# Patient Record
Sex: FEMALE | Race: WHITE
[De-identification: names, ages, dates, MRNs, and addresses within clinical notes are randomized per-mention and may not be internally consistent; named-entity substitution may affect disease eponyms.]

---

## 2017-01-09 ENCOUNTER — HOSPITAL ENCOUNTER (OUTPATIENT)
Dept: HOSPITAL 47 - MMGSC | Age: 37
Discharge: HOME | End: 2017-01-09
Payer: MEDICAID

## 2017-01-09 DIAGNOSIS — E03.9: Primary | ICD-10-CM

## 2017-01-09 PROCEDURE — 84443 ASSAY THYROID STIM HORMONE: CPT

## 2017-01-09 PROCEDURE — 84439 ASSAY OF FREE THYROXINE: CPT

## 2017-01-09 PROCEDURE — 36415 COLL VENOUS BLD VENIPUNCTURE: CPT

## 2017-05-22 ENCOUNTER — HOSPITAL ENCOUNTER (OUTPATIENT)
Dept: HOSPITAL 47 - MMGSC | Age: 37
End: 2017-05-22
Payer: COMMERCIAL

## 2017-05-22 DIAGNOSIS — Z00.00: Primary | ICD-10-CM

## 2017-05-22 LAB
ALP SERPL-CCNC: 65 U/L (ref 38–126)
ALT SERPL-CCNC: 86 U/L (ref 9–52)
ANION GAP SERPL CALC-SCNC: 16 MMOL/L
AST SERPL-CCNC: 73 U/L (ref 14–36)
BASOPHILS # BLD AUTO: 0.1 K/UL (ref 0–0.2)
BASOPHILS NFR BLD AUTO: 1 %
BUN SERPL-SCNC: 11 MG/DL (ref 7–17)
CALCIUM SPEC-MCNC: 9.5 MG/DL (ref 8.4–10.2)
CH: 26.9
CHCM: 31
CHLORIDE SERPL-SCNC: 103 MMOL/L (ref 98–107)
CHOLEST SERPL-MCNC: 193 MG/DL (ref ?–200)
CO2 SERPL-SCNC: 27 MMOL/L (ref 22–30)
EOSINOPHIL # BLD AUTO: 0.4 K/UL (ref 0–0.7)
EOSINOPHIL NFR BLD AUTO: 5 %
ERYTHROCYTE [DISTWIDTH] IN BLOOD BY AUTOMATED COUNT: 5.13 M/UL (ref 3.8–5.4)
ERYTHROCYTE [DISTWIDTH] IN BLOOD: 14.8 % (ref 11.5–15.5)
GLUCOSE SERPL-MCNC: 85 MG/DL (ref 74–99)
HCT VFR BLD AUTO: 44.7 % (ref 34–46)
HDLC SERPL-MCNC: 36 MG/DL (ref 40–60)
HDW: 2.67
HGB BLD-MCNC: 14 GM/DL (ref 11.4–16)
LUC NFR BLD AUTO: 1 %
LYMPHOCYTES # SPEC AUTO: 2.5 K/UL (ref 1–4.8)
LYMPHOCYTES NFR SPEC AUTO: 31 %
MCH RBC QN AUTO: 27.2 PG (ref 25–35)
MCHC RBC AUTO-ENTMCNC: 31.3 G/DL (ref 31–37)
MCV RBC AUTO: 87.2 FL (ref 80–100)
MONOCYTES # BLD AUTO: 0.4 K/UL (ref 0–1)
MONOCYTES NFR BLD AUTO: 4 %
NEUTROPHILS # BLD AUTO: 4.6 K/UL (ref 1.3–7.7)
NEUTROPHILS NFR BLD AUTO: 58 %
NON-AFRICAN AMERICAN GFR(MDRD): >60
POTASSIUM SERPL-SCNC: 4 MMOL/L (ref 3.5–5.1)
PROT SERPL-MCNC: 7.9 G/DL (ref 6.3–8.2)
SODIUM SERPL-SCNC: 146 MMOL/L (ref 137–145)
TRIGL SERPL-MCNC: 281 MG/DL (ref ?–150)
WBC # BLD AUTO: 0.11 10*3/UL
WBC # BLD AUTO: 8 K/UL (ref 3.8–10.6)
WBC (PEROX): 7.13

## 2017-05-22 PROCEDURE — 84443 ASSAY THYROID STIM HORMONE: CPT

## 2017-05-22 PROCEDURE — 80061 LIPID PANEL: CPT

## 2017-05-22 PROCEDURE — 85025 COMPLETE CBC W/AUTO DIFF WBC: CPT

## 2017-05-22 PROCEDURE — 80053 COMPREHEN METABOLIC PANEL: CPT

## 2017-05-22 PROCEDURE — 84439 ASSAY OF FREE THYROXINE: CPT

## 2017-05-22 PROCEDURE — 36415 COLL VENOUS BLD VENIPUNCTURE: CPT

## 2017-07-06 ENCOUNTER — HOSPITAL ENCOUNTER (OUTPATIENT)
Dept: HOSPITAL 47 - BARWHC3 | Age: 37
Discharge: HOME | End: 2017-07-06
Payer: COMMERCIAL

## 2017-07-06 VITALS — BODY MASS INDEX: 52.7 KG/M2

## 2017-07-06 VITALS
SYSTOLIC BLOOD PRESSURE: 136 MMHG | DIASTOLIC BLOOD PRESSURE: 83 MMHG | RESPIRATION RATE: 16 BRPM | HEART RATE: 82 BPM | TEMPERATURE: 97.1 F

## 2017-07-06 DIAGNOSIS — E55.9: ICD-10-CM

## 2017-07-06 DIAGNOSIS — K90.89: ICD-10-CM

## 2017-07-06 DIAGNOSIS — E66.01: Primary | ICD-10-CM

## 2017-07-06 DIAGNOSIS — I10: ICD-10-CM

## 2017-07-06 LAB
ALP SERPL-CCNC: 61 U/L (ref 38–126)
ALT SERPL-CCNC: 123 U/L (ref 9–52)
ANION GAP SERPL CALC-SCNC: 10 MMOL/L
AST SERPL-CCNC: 87 U/L (ref 14–36)
BUN SERPL-SCNC: 11 MG/DL (ref 7–17)
CALCIUM SPEC-MCNC: 9.2 MG/DL (ref 8.4–10.2)
CH: 27
CHCM: 31.2
CHLORIDE SERPL-SCNC: 105 MMOL/L (ref 98–107)
CO2 SERPL-SCNC: 27 MMOL/L (ref 22–30)
EKG: (no result)
ERYTHROCYTE [DISTWIDTH] IN BLOOD BY AUTOMATED COUNT: 4.72 M/UL (ref 3.8–5.4)
ERYTHROCYTE [DISTWIDTH] IN BLOOD: 15 % (ref 11.5–15.5)
GLUCOSE SERPL-MCNC: 81 MG/DL (ref 74–99)
HCT VFR BLD AUTO: 41 % (ref 34–46)
HDW: 2.5
HGB BLD-MCNC: 13.2 GM/DL (ref 11.4–16)
IRON SERPL-MCNC: 50 UG/DL (ref 37–170)
MCH RBC QN AUTO: 28 PG (ref 25–35)
MCHC RBC AUTO-ENTMCNC: 32.3 G/DL (ref 31–37)
MCV RBC AUTO: 86.9 FL (ref 80–100)
NON-AFRICAN AMERICAN GFR(MDRD): >60
POTASSIUM SERPL-SCNC: 4.5 MMOL/L (ref 3.5–5.1)
PROT SERPL-MCNC: 7.5 G/DL (ref 6.3–8.2)
SODIUM SERPL-SCNC: 142 MMOL/L (ref 137–145)
VIT B12 SERPL-MCNC: 298 PG/ML (ref 239–931)
WBC # BLD AUTO: 7.8 K/UL (ref 3.8–10.6)

## 2017-07-06 PROCEDURE — 82306 VITAMIN D 25 HYDROXY: CPT

## 2017-07-06 PROCEDURE — 83540 ASSAY OF IRON: CPT

## 2017-07-06 PROCEDURE — 93005 ELECTROCARDIOGRAM TRACING: CPT

## 2017-07-06 PROCEDURE — 82607 VITAMIN B-12: CPT

## 2017-07-06 PROCEDURE — 85027 COMPLETE CBC AUTOMATED: CPT

## 2017-07-06 PROCEDURE — 83036 HEMOGLOBIN GLYCOSYLATED A1C: CPT

## 2017-07-06 PROCEDURE — 80053 COMPREHEN METABOLIC PANEL: CPT

## 2017-07-06 PROCEDURE — 99201: CPT

## 2017-07-06 PROCEDURE — 84425 ASSAY OF VITAMIN B-1: CPT

## 2017-07-06 NOTE — P.HPBAR
Bariatric H&P





- History & Physicial


H&P Date: 17


History & Physicial: 


Visit/CC: sleeve consult





Patient initial contact: 





Initial weight: 142.836 kg


Initial weight in pounds: 314.90


Height: 5 ft 4.76 in


Initial BMI: 52.7





Last weight: 





Current weight: 142.836 kg


Current weight in pounds: 314.90


Current BMI: 52.7





Ideal body weight (based on NIH guidelines): 56.155 kg





Excess body weight loss: 0.0%








The patient is a 37 year-old F who presents for Bariatric Assessment.  Patient 

seen today at the bariatric clinic for a new patient evaluation.  The patient 

has family members that have had both a gastric bypass and the sleeve 

gastrectomy.  Patient is interested in sleeve gastrectomy at this point.  The 

patient suffers from hypertension.  She did have gestational diabetes in the 

past.  Denies any history of DVT or dysphagia in the past.  The patient has 

tried a variety of different weight loss modalities without any sustained 

weight loss.














Review of Systems





The patient denies any acute changes in vision or hearing, no dysphagia or 

odynophagia, no chest pain or shortness of breath, no dysuria or hematuria, no 

headache, no runny nose, no rectal bleeding or melena, no unexplained weight 

loss 





Past Medical History


Past Medical History: Asthma, Thyroid Disorder


Additional Past Medical History / Comment(s): GESTATIONAL DIABETES,VARICOSE 

VEINS,VERTIGO,IBS


History of Any Multi-Drug Resistant Organisms: None Reported


Past Surgical History:  Section, Orthopedic Surgery


Additional Past Surgical History / Comment(s): EXTERNAL PELVIC FIXATOR APPLIED 

AND REMOVAL,ORIF LT ANKLE, lasic,  x2


Past Anesthesia/Blood Transfusion Reactions: Motion Sickness


Past Psychological History: No Psychological Hx Reported


Additional Psychological History / Comment(s): suffered from Post partum 

depression, since resolved


Smoking Status: Never smoker


Past Alcohol Use History: Rare


Past Drug Use History: None Reported





- Past Family History


  ** Father


Family Medical History: Coronary Artery Disease (CAD), Diabetes Mellitus


Additional Family Medical History / Comment(s):  at age 62





  ** Brother(s)


Family Medical History: Cancer


Additional Family Medical History / Comment(s): LYMPHOMA





  ** Mother


Family Medical History: Deep Vein Thrombosis (DVT), Pulmonary Embolus





Surgical - Exam


 Vital Signs











Temp Pulse Resp BP


 


 97.1 F L  82   16   136/83 


 


 17 14:20  17 14:20  17 14:20  17 14:20

















Physical exam:


General: Well-developed, well-nourished


HEENT: Normocephalic, sclerae nonicteric


Abdomen: Nontender, nondistended


Extremities: No edema


Neuro: Alert and oriented





Bariatric Assessment & Plan


(1) Morbid obesity


Narrative/Plan: 


The surgical risks and benefits were discussed in detail with the patient 

today.  All questions were appropriately answered.  We'll tentatively schedule 

for basic lab work and upper endoscopy as a preoperative evaluation.  

Definitive bariatric procedure scheduling to follow.


Status: Acute   





Bariatric Checklist


Checklist: 


Plan: 





Checklist: 





EGD: 


1. Hiatal hernia: 


2. H. Pylori: 





HgbA1c: 





Vitamin D: 





Smoking: Never smoker





Primary care physician referral: Maria A See ([place])





Psychiatry clearance: 





Cardiology clearance: 





Sleep study: 





Diet journal: 





VTE risk score: 





VTE risk level: 





Rehab needs at discharge:

## 2017-07-07 LAB — HEMOGLOBIN A1C: 5.7 % (ref 4.2–6.1)

## 2017-08-04 ENCOUNTER — HOSPITAL ENCOUNTER (OUTPATIENT)
Dept: HOSPITAL 47 - ORWHC2ENDO | Age: 37
Discharge: HOME | End: 2017-08-04
Payer: COMMERCIAL

## 2017-08-04 VITALS — DIASTOLIC BLOOD PRESSURE: 91 MMHG | HEART RATE: 76 BPM | SYSTOLIC BLOOD PRESSURE: 136 MMHG

## 2017-08-04 VITALS — TEMPERATURE: 97.2 F

## 2017-08-04 VITALS — BODY MASS INDEX: 51.7 KG/M2

## 2017-08-04 VITALS — RESPIRATION RATE: 16 BRPM

## 2017-08-04 DIAGNOSIS — Z91.012: ICD-10-CM

## 2017-08-04 DIAGNOSIS — I10: ICD-10-CM

## 2017-08-04 DIAGNOSIS — E07.9: ICD-10-CM

## 2017-08-04 DIAGNOSIS — Z91.011: ICD-10-CM

## 2017-08-04 DIAGNOSIS — Z91.040: ICD-10-CM

## 2017-08-04 DIAGNOSIS — J45.909: ICD-10-CM

## 2017-08-04 DIAGNOSIS — Z79.899: ICD-10-CM

## 2017-08-04 DIAGNOSIS — K29.50: Primary | ICD-10-CM

## 2017-08-04 LAB — GLUCOSE BLD-MCNC: 83 MG/DL (ref 75–99)

## 2017-08-04 PROCEDURE — 88305 TISSUE EXAM BY PATHOLOGIST: CPT

## 2017-08-04 PROCEDURE — 88342 IMHCHEM/IMCYTCHM 1ST ANTB: CPT

## 2017-08-04 PROCEDURE — 81025 URINE PREGNANCY TEST: CPT

## 2017-08-04 PROCEDURE — 43239 EGD BIOPSY SINGLE/MULTIPLE: CPT

## 2017-08-04 NOTE — P.GSHP
History of Present Illness


H&P Date: 17


Chief Complaint: GERD





Patient today for upper endoscopy.  She's been worked up for possible bariatric 

surgery.  She has mild intermittent reflux.  Denies dysphagia.





Past Medical History


Past Medical History: Asthma, Thyroid Disorder


Additional Past Medical History / Comment(s): HAD GESTATIONAL DIABETES,VARICOSE 

VEINS,VERTIGO,IBS


History of Any Multi-Drug Resistant Organisms: None Reported


Past Surgical History:  Section, Orthopedic Surgery, Tubal Ligation


Additional Past Surgical History / Comment(s): EXTERNAL PELVIC FIXATOR APPLIED 

AND REMOVAL,ORIF LT ANKLE, lasic,  x2


Past Anesthesia/Blood Transfusion Reactions: Motion Sickness


Smoking Status: Never smoker





- Past Family History


  ** Father


Family Medical History: Coronary Artery Disease (CAD), Diabetes Mellitus


Additional Family Medical History / Comment(s):  at age 62





  ** Brother(s)


Family Medical History: Cancer


Additional Family Medical History / Comment(s): LYMPHOMA





  ** Mother


Family Medical History: Deep Vein Thrombosis (DVT), Pulmonary Embolus





Medications and Allergies


 Home Medications











 Medication  Instructions  Recorded  Confirmed  Type


 


Levothyroxine Sodium [Synthroid] 100 mcg PO DAILY 14 History


 


Albuterol Sulfate [Proair Hfa] 1 - 2 puff INHALATION Q6HR PRN 17 

History


 


Cholestyramine (with Sugar) 4 gm PO DAILY 17 History





[Cholestyramine Packet]    


 


Triamterene-Hctz 37.5-25Mg 1 cap PO DAILY 17 History





[Dyazide 37.5-25 Capsule]    


 


Ergocalciferol (Vitamin D2) 50,000 unit PO Q7D 17 History





[Vitamin D2]    











 Allergies











Allergy/AdvReac Type Severity Reaction Status Date / Time


 


egg Allergy  Diarrhea Verified 17 12:40


 


lactose Allergy  Diarrhea Verified 17 12:40


 


latex Allergy  Rash/Hives Verified 17 12:40


 


soy Allergy  Diarrhea Verified 17 12:40














Surgical - Exam


 Vital Signs











Temp Pulse Resp BP Pulse Ox


 


 97.2 F L  88   18   141/84   97 


 


 17 12:56  17 12:56  17 12:56  17 12:56  17 12:56

















Physical exam:


General: Well-developed, well-nourished


HEENT: Normocephalic, sclerae nonicteric


Abdomen: Nontender, nondistended


Extremities: No edema


Neuro: Alert and oriented





Assessment and Plan


(1) GERD (gastroesophageal reflux disease)


Narrative/Plan: 


Will proceed with upper endoscopy at this time.


Status: Acute

## 2017-08-04 NOTE — P.PCN
Date of Procedure: 08/04/17


Preoperative Diagnosis: 





Postoperative Diagnosis: 





Procedure(s) Performed: 


Preoperative Dx: GERD


Postoperative Dx: Gastritis with small erosions


Procedure: EGD with Bx


Anesthesia: Sedation


Endoscopist: Dr. Kirk


Specimens: Antrum


Endoscopic Procedure:   The patient was on the endoscopy table in the left 

decubitus position.  The Olympus gastroscope was inserted into the oropharynx 

and passed under direct visualization to the region of the third portion of the 

duodenum.  From that point the scope was slowly withdrawn inspecting all 

surfaces carefully.  There were no neoplastic inflammatory or polypoid lesions 

throughout the duodenum.  The pylorus was widely patent.  The stomach was 

carefully inspected.  There was antritis present with a few small erosions.  A 

biopsy of the antrum took place to rule out H. pylori.  Retroflexion revealed a 

normal hiatus.  The esophagus was then carefully examined.  There were no 

neoplastic inflammatory or polypoid lesions throughout the visualized 

esophagus.  The patient was then taken to the recovery room in stable condition 

per anesthesia guidelines.


Recommendations: Will begin antiacid therapy.  Await biopsy results.  Patient 

will follow up in the bariatric clinic in 3-4 weeks.


Implants: 





Indications for Procedure: 





Operative Findings: 





Description of Procedure:

## 2017-08-07 ENCOUNTER — HOSPITAL ENCOUNTER (OUTPATIENT)
Dept: HOSPITAL 47 - MMGSC | Age: 37
Discharge: HOME | End: 2017-08-07
Payer: COMMERCIAL

## 2017-08-07 DIAGNOSIS — E55.9: Primary | ICD-10-CM

## 2017-08-07 PROCEDURE — 82306 VITAMIN D 25 HYDROXY: CPT

## 2017-08-07 PROCEDURE — 36415 COLL VENOUS BLD VENIPUNCTURE: CPT

## 2017-08-21 ENCOUNTER — HOSPITAL ENCOUNTER (OUTPATIENT)
Dept: HOSPITAL 47 - RADUSWWP | Age: 37
Discharge: HOME | End: 2017-08-21
Payer: COMMERCIAL

## 2017-08-21 DIAGNOSIS — K76.0: Primary | ICD-10-CM

## 2017-08-21 PROCEDURE — 76705 ECHO EXAM OF ABDOMEN: CPT

## 2017-08-21 NOTE — US
EXAMINATION TYPE: US liver

 

DATE OF EXAM: 8/21/2017

 

COMPARISON: NONE

 

CLINICAL HISTORY: Elevated LFTs R94.5. no symptoms

 

EXAM MEASUREMENTS:

 

Liver Length:  19.9 cm   

Gallbladder Wall:  0.2 cm   

CBD:  0.6 cm

Right Kidney:  11.9 x 5.1 x 4.6 cm

 

limited visibility due to bowel gas and habitus

 

Pancreas:  limited views appear, wnl

Liver:  difficult to penetrate, enlarged  

Gallbladder:  wnl

**Evidence for sonographic Santo's sign:  no

CBD:  wnl 

Right Kidney:  wnl 

 

There appears to be moderate fatty infiltration to the liver. Hepatomegaly is present in 19.9 cm. Nor
mal less than 15.5 cm.

 

IMPRESSION: 

1. Hepatomegaly with moderate fatty infiltration liver.

## 2017-11-06 ENCOUNTER — HOSPITAL ENCOUNTER (OUTPATIENT)
Dept: HOSPITAL 47 - BARWHC3 | Age: 37
End: 2017-11-06
Payer: COMMERCIAL

## 2017-11-06 VITALS — BODY MASS INDEX: 53.6 KG/M2

## 2017-11-06 DIAGNOSIS — Z71.3: Primary | ICD-10-CM

## 2017-11-06 DIAGNOSIS — E66.01: ICD-10-CM

## 2017-11-06 PROCEDURE — 97804 MEDICAL NUTRITION GROUP: CPT

## 2017-11-21 ENCOUNTER — HOSPITAL ENCOUNTER (OUTPATIENT)
Dept: HOSPITAL 47 - LABPAT | Age: 37
Discharge: HOME | End: 2017-11-21
Payer: COMMERCIAL

## 2017-11-21 DIAGNOSIS — Z01.812: Primary | ICD-10-CM

## 2017-11-21 LAB
ALP SERPL-CCNC: 53 U/L (ref 38–126)
ALT SERPL-CCNC: 136 U/L (ref 9–52)
ANION GAP SERPL CALC-SCNC: 12 MMOL/L
AST SERPL-CCNC: 109 U/L (ref 14–36)
BASOPHILS # BLD AUTO: 0 K/UL (ref 0–0.2)
BASOPHILS NFR BLD AUTO: 1 %
BUN SERPL-SCNC: 17 MG/DL (ref 7–17)
CALCIUM SPEC-MCNC: 10 MG/DL (ref 8.4–10.2)
CH: 26.5
CHCM: 30.4
CHLORIDE SERPL-SCNC: 102 MMOL/L (ref 98–107)
CO2 SERPL-SCNC: 28 MMOL/L (ref 22–30)
EOSINOPHIL # BLD AUTO: 0.3 K/UL (ref 0–0.7)
EOSINOPHIL NFR BLD AUTO: 4 %
ERYTHROCYTE [DISTWIDTH] IN BLOOD BY AUTOMATED COUNT: 4.79 M/UL (ref 3.8–5.4)
ERYTHROCYTE [DISTWIDTH] IN BLOOD: 15.5 % (ref 11.5–15.5)
GLUCOSE SERPL-MCNC: 86 MG/DL (ref 74–99)
HCT VFR BLD AUTO: 41.9 % (ref 34–46)
HDW: 2.43
HGB BLD-MCNC: 12.9 GM/DL (ref 11.4–16)
LUC NFR BLD AUTO: 2 %
LYMPHOCYTES # SPEC AUTO: 2.2 K/UL (ref 1–4.8)
LYMPHOCYTES NFR SPEC AUTO: 32 %
MCH RBC QN AUTO: 26.9 PG (ref 25–35)
MCHC RBC AUTO-ENTMCNC: 30.8 G/DL (ref 31–37)
MCV RBC AUTO: 87.5 FL (ref 80–100)
MONOCYTES # BLD AUTO: 0.3 K/UL (ref 0–1)
MONOCYTES NFR BLD AUTO: 4 %
NEUTROPHILS # BLD AUTO: 3.9 K/UL (ref 1.3–7.7)
NEUTROPHILS NFR BLD AUTO: 57 %
NON-AFRICAN AMERICAN GFR(MDRD): >60
POTASSIUM SERPL-SCNC: 4.3 MMOL/L (ref 3.5–5.1)
PROT SERPL-MCNC: 8.3 G/DL (ref 6.3–8.2)
SODIUM SERPL-SCNC: 142 MMOL/L (ref 137–145)
WBC # BLD AUTO: 0.15 10*3/UL
WBC # BLD AUTO: 6.8 K/UL (ref 3.8–10.6)
WBC (PEROX): 6.9

## 2017-11-21 PROCEDURE — 85025 COMPLETE CBC W/AUTO DIFF WBC: CPT

## 2017-11-21 PROCEDURE — 80053 COMPREHEN METABOLIC PANEL: CPT

## 2017-11-21 PROCEDURE — 36415 COLL VENOUS BLD VENIPUNCTURE: CPT

## 2017-12-12 ENCOUNTER — HOSPITAL ENCOUNTER (OUTPATIENT)
Dept: HOSPITAL 47 - BARWHC3 | Age: 37
Discharge: HOME | End: 2017-12-12
Payer: COMMERCIAL

## 2017-12-12 VITALS
DIASTOLIC BLOOD PRESSURE: 91 MMHG | HEART RATE: 80 BPM | TEMPERATURE: 98.3 F | RESPIRATION RATE: 20 BRPM | SYSTOLIC BLOOD PRESSURE: 115 MMHG

## 2017-12-12 VITALS — BODY MASS INDEX: 47.8 KG/M2

## 2017-12-12 DIAGNOSIS — E55.9: ICD-10-CM

## 2017-12-12 DIAGNOSIS — E66.01: Primary | ICD-10-CM

## 2017-12-12 PROCEDURE — 99211 OFF/OP EST MAY X REQ PHY/QHP: CPT

## 2017-12-12 PROCEDURE — 97803 MED NUTRITION INDIV SUBSEQ: CPT

## 2017-12-12 PROCEDURE — 99201: CPT

## 2017-12-28 NOTE — P.BASOAP
Subjective


Progress Note Date: 12/28/17


Principal diagnosis: 





Morbid obesity





Patient returns after recent sleeve gastrectomy.  Doing well.  Mild discomforts 

are improving.  Tolerating liquid and protein.  No heartburn.





Objective





- Vital Signs


Vital signs: 


 Vital Signs











Temp  98.3 F   12/12/17 12:40


 


Pulse  80   12/12/17 12:40


 


Resp  20   12/12/17 12:40


 


BP  115/91   12/12/17 12:40


 


Pulse Ox      














- Exam





Abdomen: Incisions clean and dry, minimal tenderness





Assessment/Plan


(1) Morbid obesity


Narrative/Plan: 


Continue gradually advancing diet.  Gradually increase activity as well.  Follow

-up 2-3 weeks.





Plan: 


Date: 12/12/17





Initial Weight: 142.836 kg





Initial BMI: 52.8





Current Weight: 129.41 kg





Current BMI: 47.8





Type of Surgery: 





Total Volume in Band: 





Previous Volume: 





Volume Removed: 





Volume Added: 





Band Size:

## 2018-01-08 ENCOUNTER — HOSPITAL ENCOUNTER (OUTPATIENT)
Dept: HOSPITAL 47 - LABWHC1 | Age: 38
Discharge: HOME | End: 2018-01-08
Payer: COMMERCIAL

## 2018-01-08 DIAGNOSIS — K90.9: ICD-10-CM

## 2018-01-08 DIAGNOSIS — R79.89: ICD-10-CM

## 2018-01-08 DIAGNOSIS — E55.9: ICD-10-CM

## 2018-01-08 DIAGNOSIS — K90.89: ICD-10-CM

## 2018-01-08 DIAGNOSIS — E66.01: Primary | ICD-10-CM

## 2018-01-08 LAB
ALBUMIN SERPL-MCNC: 4.3 G/DL (ref 3.5–5)
ALP SERPL-CCNC: 53 U/L (ref 38–126)
ALT SERPL-CCNC: 94 U/L (ref 9–52)
ANION GAP SERPL CALC-SCNC: 12 MMOL/L
AST SERPL-CCNC: 64 U/L (ref 14–36)
BUN SERPL-SCNC: 14 MG/DL (ref 7–17)
CALCIUM SPEC-MCNC: 9.9 MG/DL (ref 8.4–10.2)
CHLORIDE SERPL-SCNC: 106 MMOL/L (ref 98–107)
CO2 SERPL-SCNC: 29 MMOL/L (ref 22–30)
ERYTHROCYTE [DISTWIDTH] IN BLOOD BY AUTOMATED COUNT: 5.17 M/UL (ref 3.8–5.4)
ERYTHROCYTE [DISTWIDTH] IN BLOOD: 16.3 % (ref 11.5–15.5)
GLUCOSE SERPL-MCNC: 108 MG/DL (ref 74–99)
HCT VFR BLD AUTO: 45.7 % (ref 34–46)
HGB BLD-MCNC: 13.9 GM/DL (ref 11.4–16)
MCH RBC QN AUTO: 26.9 PG (ref 25–35)
MCHC RBC AUTO-ENTMCNC: 30.4 G/DL (ref 31–37)
MCV RBC AUTO: 88.5 FL (ref 80–100)
PLATELET # BLD AUTO: 219 K/UL (ref 150–450)
POTASSIUM SERPL-SCNC: 4.2 MMOL/L (ref 3.5–5.1)
PROT SERPL-MCNC: 7.7 G/DL (ref 6.3–8.2)
SODIUM SERPL-SCNC: 147 MMOL/L (ref 137–145)
VIT B12 SERPL-MCNC: 800 PG/ML (ref 200–944)
WBC # BLD AUTO: 5.4 K/UL (ref 3.8–10.6)

## 2018-01-08 PROCEDURE — 83540 ASSAY OF IRON: CPT

## 2018-01-08 PROCEDURE — 84425 ASSAY OF VITAMIN B-1: CPT

## 2018-01-08 PROCEDURE — 85027 COMPLETE CBC AUTOMATED: CPT

## 2018-01-08 PROCEDURE — 80053 COMPREHEN METABOLIC PANEL: CPT

## 2018-01-08 PROCEDURE — 84590 ASSAY OF VITAMIN A: CPT

## 2018-01-08 PROCEDURE — 36415 COLL VENOUS BLD VENIPUNCTURE: CPT

## 2018-01-08 PROCEDURE — 82607 VITAMIN B-12: CPT

## 2018-01-08 PROCEDURE — 82306 VITAMIN D 25 HYDROXY: CPT

## 2018-01-09 ENCOUNTER — HOSPITAL ENCOUNTER (OUTPATIENT)
Dept: HOSPITAL 47 - BARWHC3 | Age: 38
Discharge: HOME | End: 2018-01-09
Payer: COMMERCIAL

## 2018-01-09 VITALS — BODY MASS INDEX: 46.4 KG/M2

## 2018-01-09 DIAGNOSIS — E66.01: Primary | ICD-10-CM

## 2018-01-09 PROCEDURE — 97803 MED NUTRITION INDIV SUBSEQ: CPT

## 2018-01-09 PROCEDURE — 99211 OFF/OP EST MAY X REQ PHY/QHP: CPT

## 2018-01-09 NOTE — P.BASOAP
Subjective


Progress Note Date: 01/09/18


Principal diagnosis: 





Morbid obesity





Patient returns for evaluation.  Had labs just yesterday which showed a low iron

, vitamin A, and vitamin D mild nausea at times and she has had 2 episodes of 

emesis.  Overall she believes she is improving gradually.  Protein and liquid 

intake in good range.  Good Weight loss.





Objective





- Vital Signs


Vital signs: 


 Intake & Output











 01/08/18 01/09/18 01/09/18





 18:59 06:59 18:59


 


Weight   125.645 kg














- Exam





Abdomen: Soft, nondistended, nontender, incision clean and dry





Assessment/Plan


(1) Morbid obesity


Narrative/Plan: 


Continue vitamin supplementation.  Continue antiacid therapy.  Zofran when 

necessary.  Follow-up 6 weeks.





Plan: 


Date: 01/09/18





Initial Weight: 142.836 kg





Initial BMI: 52.8





Current Weight: 125.645 kg





Current BMI: 46.4





Type of Surgery: 





Total Volume in Band: 





Previous Volume: 





Volume Removed: 





Volume Added: 





Band Size:

## 2018-02-13 ENCOUNTER — HOSPITAL ENCOUNTER (OUTPATIENT)
Dept: HOSPITAL 47 - BARWHC3 | Age: 38
Discharge: HOME | End: 2018-02-13
Payer: COMMERCIAL

## 2018-02-13 VITALS
HEART RATE: 88 BPM | SYSTOLIC BLOOD PRESSURE: 130 MMHG | DIASTOLIC BLOOD PRESSURE: 82 MMHG | TEMPERATURE: 98.1 F | RESPIRATION RATE: 16 BRPM

## 2018-02-13 VITALS — BODY MASS INDEX: 43.2 KG/M2

## 2018-02-13 DIAGNOSIS — Z09: Primary | ICD-10-CM

## 2018-02-13 DIAGNOSIS — E55.9: ICD-10-CM

## 2018-02-13 DIAGNOSIS — K90.89: ICD-10-CM

## 2018-02-13 DIAGNOSIS — E66.01: ICD-10-CM

## 2018-02-13 PROCEDURE — 99211 OFF/OP EST MAY X REQ PHY/QHP: CPT

## 2018-02-13 PROCEDURE — 97803 MED NUTRITION INDIV SUBSEQ: CPT

## 2018-02-13 NOTE — P.BASOAP
Subjective


Progress Note Date: 02/13/18


Principal diagnosis: 





Morbid obesity





Patient doing well today.  No pain.  No heartburn while taking Prilosec.  No 

dysphagia.  She has lost 20 pounds in the last 5 weeks.  She is due for a 3 

month labs at the end of this month.  Energy level has improved.  Still taking 

her vitamins.





Objective





- Vital Signs


Vital signs: 


 Vital Signs











Temp  98.1 F   02/13/18 15:08


 


Pulse  88   02/13/18 15:08


 


Resp  16   02/13/18 15:08


 


BP  130/82   02/13/18 15:08


 


Pulse Ox      








 Intake & Output











 02/12/18 02/13/18 02/13/18





 18:59 06:59 18:59


 


Weight   116.981 kg














- Exam





Abdomen: Soft, nondistended, nontender





Assessment/Plan


(1) Morbid obesity


Narrative/Plan: 


Will order three-month lab work.  Continue omeprazole for now.  Continue 

increasing activity.  Dietary evaluation today.  Follow-up with me in 4-6 weeks.





Plan: 


Date: 02/13/18





Initial Weight: 142.836 kg





Initial BMI: 52.8





Current Weight: 116.981 kg





Current BMI: 43.2





Type of Surgery: 





Total Volume in Band: 





Previous Volume: 





Volume Removed: 





Volume Added: 





Band Size:

## 2018-03-12 ENCOUNTER — HOSPITAL ENCOUNTER (OUTPATIENT)
Dept: HOSPITAL 47 - MMGSC | Age: 38
Discharge: HOME | End: 2018-03-12
Payer: COMMERCIAL

## 2018-03-12 DIAGNOSIS — E03.9: ICD-10-CM

## 2018-03-12 DIAGNOSIS — Z00.00: Primary | ICD-10-CM

## 2018-03-12 DIAGNOSIS — Z98.84: ICD-10-CM

## 2018-03-12 LAB
ALBUMIN SERPL-MCNC: 4.2 G/DL (ref 3.5–5)
ALP SERPL-CCNC: 60 U/L (ref 38–126)
ALT SERPL-CCNC: 60 U/L (ref 9–52)
ANION GAP SERPL CALC-SCNC: 11 MMOL/L
AST SERPL-CCNC: 49 U/L (ref 14–36)
BASOPHILS # BLD AUTO: 0 K/UL (ref 0–0.2)
BASOPHILS NFR BLD AUTO: 1 %
BUN SERPL-SCNC: 13 MG/DL (ref 7–17)
CALCIUM SPEC-MCNC: 9.8 MG/DL (ref 8.4–10.2)
CHLORIDE SERPL-SCNC: 102 MMOL/L (ref 98–107)
CHOLEST SERPL-MCNC: 208 MG/DL (ref ?–200)
CO2 SERPL-SCNC: 30 MMOL/L (ref 22–30)
EOSINOPHIL # BLD AUTO: 0.2 K/UL (ref 0–0.7)
EOSINOPHIL NFR BLD AUTO: 4 %
ERYTHROCYTE [DISTWIDTH] IN BLOOD BY AUTOMATED COUNT: 5.03 M/UL (ref 3.8–5.4)
ERYTHROCYTE [DISTWIDTH] IN BLOOD: 15.1 % (ref 11.5–15.5)
GLUCOSE SERPL-MCNC: 99 MG/DL (ref 74–99)
HCT VFR BLD AUTO: 42.6 % (ref 34–46)
HDLC SERPL-MCNC: 36 MG/DL (ref 40–60)
HGB BLD-MCNC: 13.8 GM/DL (ref 11.4–16)
LDLC SERPL CALC-MCNC: 137 MG/DL (ref 0–99)
LYMPHOCYTES # SPEC AUTO: 1.8 K/UL (ref 1–4.8)
LYMPHOCYTES NFR SPEC AUTO: 29 %
MCH RBC QN AUTO: 27.4 PG (ref 25–35)
MCHC RBC AUTO-ENTMCNC: 32.3 G/DL (ref 31–37)
MCV RBC AUTO: 84.7 FL (ref 80–100)
MONOCYTES # BLD AUTO: 0.2 K/UL (ref 0–1)
MONOCYTES NFR BLD AUTO: 4 %
NEUTROPHILS # BLD AUTO: 3.7 K/UL (ref 1.3–7.7)
NEUTROPHILS NFR BLD AUTO: 61 %
PLATELET # BLD AUTO: 223 K/UL (ref 150–450)
POTASSIUM SERPL-SCNC: 3.7 MMOL/L (ref 3.5–5.1)
PROT SERPL-MCNC: 7.8 G/DL (ref 6.3–8.2)
SODIUM SERPL-SCNC: 143 MMOL/L (ref 137–145)
T4 FREE SERPL-MCNC: 1.3 NG/DL (ref 0.78–2.19)
TRIGL SERPL-MCNC: 173 MG/DL (ref ?–150)
WBC # BLD AUTO: 6.1 K/UL (ref 3.8–10.6)

## 2018-03-12 PROCEDURE — 80061 LIPID PANEL: CPT

## 2018-03-12 PROCEDURE — 84439 ASSAY OF FREE THYROXINE: CPT

## 2018-03-12 PROCEDURE — 85025 COMPLETE CBC W/AUTO DIFF WBC: CPT

## 2018-03-12 PROCEDURE — 82607 VITAMIN B-12: CPT

## 2018-03-12 PROCEDURE — 82306 VITAMIN D 25 HYDROXY: CPT

## 2018-03-12 PROCEDURE — 84443 ASSAY THYROID STIM HORMONE: CPT

## 2018-03-12 PROCEDURE — 36415 COLL VENOUS BLD VENIPUNCTURE: CPT

## 2018-03-12 PROCEDURE — 80053 COMPREHEN METABOLIC PANEL: CPT

## 2018-03-13 LAB — VIT B12 SERPL-MCNC: 524 PG/ML (ref 200–944)

## 2018-03-19 ENCOUNTER — HOSPITAL ENCOUNTER (OUTPATIENT)
Dept: HOSPITAL 47 - LABWHC1 | Age: 38
Discharge: HOME | End: 2018-03-19
Payer: COMMERCIAL

## 2018-03-19 DIAGNOSIS — R90.89: ICD-10-CM

## 2018-03-19 DIAGNOSIS — E55.9: Primary | ICD-10-CM

## 2018-03-19 LAB
ALBUMIN SERPL-MCNC: 4.5 G/DL (ref 3.5–5)
ALP SERPL-CCNC: 70 U/L (ref 38–126)
ALT SERPL-CCNC: 49 U/L (ref 9–52)
ANION GAP SERPL CALC-SCNC: 13 MMOL/L
AST SERPL-CCNC: 48 U/L (ref 14–36)
BUN SERPL-SCNC: 13 MG/DL (ref 7–17)
CALCIUM SPEC-MCNC: 10 MG/DL (ref 8.4–10.2)
CHLORIDE SERPL-SCNC: 99 MMOL/L (ref 98–107)
CO2 SERPL-SCNC: 30 MMOL/L (ref 22–30)
ERYTHROCYTE [DISTWIDTH] IN BLOOD BY AUTOMATED COUNT: 5.05 M/UL (ref 3.8–5.4)
ERYTHROCYTE [DISTWIDTH] IN BLOOD: 14.9 % (ref 11.5–15.5)
GLUCOSE SERPL-MCNC: 89 MG/DL (ref 74–99)
HCT VFR BLD AUTO: 42.7 % (ref 34–46)
HGB BLD-MCNC: 14.1 GM/DL (ref 11.4–16)
MCH RBC QN AUTO: 27.8 PG (ref 25–35)
MCHC RBC AUTO-ENTMCNC: 32.9 G/DL (ref 31–37)
MCV RBC AUTO: 84.4 FL (ref 80–100)
PLATELET # BLD AUTO: 245 K/UL (ref 150–450)
POTASSIUM SERPL-SCNC: 3.7 MMOL/L (ref 3.5–5.1)
PROT SERPL-MCNC: 8.2 G/DL (ref 6.3–8.2)
SODIUM SERPL-SCNC: 142 MMOL/L (ref 137–145)
WBC # BLD AUTO: 9.1 K/UL (ref 3.8–10.6)

## 2018-03-19 PROCEDURE — 80053 COMPREHEN METABOLIC PANEL: CPT

## 2018-03-19 PROCEDURE — 83540 ASSAY OF IRON: CPT

## 2018-03-19 PROCEDURE — 82306 VITAMIN D 25 HYDROXY: CPT

## 2018-03-19 PROCEDURE — 83550 IRON BINDING TEST: CPT

## 2018-03-19 PROCEDURE — 36415 COLL VENOUS BLD VENIPUNCTURE: CPT

## 2018-03-19 PROCEDURE — 85027 COMPLETE CBC AUTOMATED: CPT

## 2018-03-19 PROCEDURE — 84425 ASSAY OF VITAMIN B-1: CPT

## 2018-03-27 ENCOUNTER — HOSPITAL ENCOUNTER (OUTPATIENT)
Dept: HOSPITAL 47 - BARWHC3 | Age: 38
Discharge: HOME | End: 2018-03-27
Payer: COMMERCIAL

## 2018-03-27 VITALS
SYSTOLIC BLOOD PRESSURE: 129 MMHG | HEART RATE: 80 BPM | DIASTOLIC BLOOD PRESSURE: 83 MMHG | TEMPERATURE: 97.7 F | RESPIRATION RATE: 16 BRPM

## 2018-03-27 VITALS — BODY MASS INDEX: 41.4 KG/M2

## 2018-03-27 DIAGNOSIS — E66.01: Primary | ICD-10-CM

## 2018-03-27 PROCEDURE — 99211 OFF/OP EST MAY X REQ PHY/QHP: CPT

## 2018-03-27 NOTE — P.BASOAP
Subjective


Progress Note Date: 03/27/18


Principal diagnosis: 





Morbid obesity





Patient doing well today.  She has had a 12 pound weight loss since last visit.

  Denies heartburn, no nausea or vomiting, no pain.  Her labs from last visit 

were reviewed and reveal a low iron and a low vitamin D.  She is already taking 

supplementation.





Objective





- Vital Signs


Vital signs: 


 Vital Signs











Temp  97.7 F   03/27/18 15:03


 


Pulse  80   03/27/18 15:03


 


Resp  16   03/27/18 15:03


 


BP  129/83   03/27/18 15:03


 


Pulse Ox      








 Intake & Output











 03/26/18 03/27/18 03/27/18





 18:59 06:59 18:59


 


Weight   111.3 kg














- Exam





Abdomen: Soft, nontender, nondistended





Assessment/Plan


(1) Morbid obesity


Narrative/Plan: 


Patient doing well at this time.  We'll continue vitamin supplementation.  

Continue dietary and exercise regimen.  We'll check 6 months labs at next visit.





Plan: 


Date: 03/27/18





Initial Weight: 142.836 kg





Initial BMI: 53.1





Current Weight: 111.3 kg





Current BMI: 41.4





Type of Surgery: 





Total Volume in Band: 





Previous Volume: 





Volume Removed: 





Volume Added: 





Band Size:

## 2018-07-09 ENCOUNTER — HOSPITAL ENCOUNTER (OUTPATIENT)
Dept: HOSPITAL 47 - LABWHC1 | Age: 38
Discharge: HOME | End: 2018-07-09
Payer: COMMERCIAL

## 2018-07-09 DIAGNOSIS — K90.89: Primary | ICD-10-CM

## 2018-07-09 DIAGNOSIS — E55.9: ICD-10-CM

## 2018-07-09 LAB
ALBUMIN SERPL-MCNC: 4.1 G/DL (ref 3.5–5)
ALP SERPL-CCNC: 65 U/L (ref 38–126)
ALT SERPL-CCNC: 33 U/L (ref 9–52)
ANION GAP SERPL CALC-SCNC: 12 MMOL/L
AST SERPL-CCNC: 26 U/L (ref 14–36)
BUN SERPL-SCNC: 16 MG/DL (ref 7–17)
CALCIUM SPEC-MCNC: 9.3 MG/DL (ref 8.4–10.2)
CHLORIDE SERPL-SCNC: 102 MMOL/L (ref 98–107)
CO2 SERPL-SCNC: 30 MMOL/L (ref 22–30)
ERYTHROCYTE [DISTWIDTH] IN BLOOD BY AUTOMATED COUNT: 4.86 M/UL (ref 3.8–5.4)
ERYTHROCYTE [DISTWIDTH] IN BLOOD: 13.8 % (ref 11.5–15.5)
GLUCOSE SERPL-MCNC: 93 MG/DL (ref 74–99)
HCT VFR BLD AUTO: 42.7 % (ref 34–46)
HGB BLD-MCNC: 13.7 GM/DL (ref 11.4–16)
MCH RBC QN AUTO: 28.1 PG (ref 25–35)
MCHC RBC AUTO-ENTMCNC: 32 G/DL (ref 31–37)
MCV RBC AUTO: 87.8 FL (ref 80–100)
PLATELET # BLD AUTO: 231 K/UL (ref 150–450)
POTASSIUM SERPL-SCNC: 4 MMOL/L (ref 3.5–5.1)
PROT SERPL-MCNC: 7.4 G/DL (ref 6.3–8.2)
SODIUM SERPL-SCNC: 144 MMOL/L (ref 137–145)
VIT B12 SERPL-MCNC: 486 PG/ML (ref 200–944)
WBC # BLD AUTO: 7 K/UL (ref 3.8–10.6)

## 2018-07-09 PROCEDURE — 82746 ASSAY OF FOLIC ACID SERUM: CPT

## 2018-07-09 PROCEDURE — 82607 VITAMIN B-12: CPT

## 2018-07-09 PROCEDURE — 82306 VITAMIN D 25 HYDROXY: CPT

## 2018-07-09 PROCEDURE — 80053 COMPREHEN METABOLIC PANEL: CPT

## 2018-07-09 PROCEDURE — 36415 COLL VENOUS BLD VENIPUNCTURE: CPT

## 2018-07-09 PROCEDURE — 84425 ASSAY OF VITAMIN B-1: CPT

## 2018-07-09 PROCEDURE — 85027 COMPLETE CBC AUTOMATED: CPT

## 2018-07-09 PROCEDURE — 83540 ASSAY OF IRON: CPT

## 2018-07-24 ENCOUNTER — HOSPITAL ENCOUNTER (OUTPATIENT)
Dept: HOSPITAL 47 - BARWHC3 | Age: 38
Discharge: HOME | End: 2018-07-24
Payer: COMMERCIAL

## 2018-07-24 VITALS — HEART RATE: 114 BPM | TEMPERATURE: 97.9 F | DIASTOLIC BLOOD PRESSURE: 69 MMHG | SYSTOLIC BLOOD PRESSURE: 114 MMHG

## 2018-07-24 VITALS — BODY MASS INDEX: 36 KG/M2

## 2018-07-24 DIAGNOSIS — Z71.3: ICD-10-CM

## 2018-07-24 DIAGNOSIS — E66.01: Primary | ICD-10-CM

## 2018-07-24 DIAGNOSIS — Z79.899: ICD-10-CM

## 2018-07-24 PROCEDURE — 97803 MED NUTRITION INDIV SUBSEQ: CPT

## 2018-07-24 PROCEDURE — 99211 OFF/OP EST MAY X REQ PHY/QHP: CPT

## 2018-07-24 NOTE — P.BASOAP
Subjective


Progress Note Date: 07/24/18


Principal diagnosis: 





Morbid obesity





Patient doing well today.  Last appointment was in March.  She had her six-

month lab work drawn 2 weeks ago.  Her vitamin D remains low.  She remains on 

vitamin D supplementation.  Denies GERD.  Some nausea with room temperature 

water only.  She is no longer taken Zofran.  She is no longer taking antiacids.

  She has had a few episodes of dizziness and her blood pressure was slightly 

low.  She is considering stopping her antihypertensives.  She has lost 30 

pounds since her last visit.





Objective





- Vital Signs


Vital signs: 


 Vital Signs











Temp  97.9 F   07/24/18 14:30


 


Pulse  114 H  07/24/18 14:30


 


Resp      


 


BP  114/69   07/24/18 14:30


 


Pulse Ox      








 Intake & Output











 07/23/18 07/24/18 07/24/18





 18:59 06:59 18:59


 


Weight   97.522 kg














- Exam





Abdomen: Soft, nontender, nondistended





Assessment/Plan


(1) Morbid obesity


Narrative/Plan: 


Continue dietary and exercise regimen.  Will discuss with primary care 

physician regarding stopping her antihypertensive.  Follow-up 6 weeks.





Plan: 


Date: 07/24/18





Initial Weight: 142.836 kg





Initial BMI: 52.8





Current Weight: 97.522 kg





Current BMI: 36.0





Type of Surgery: 





Total Volume in Band: 





Previous Volume: 





Volume Removed: 





Volume Added: 





Band Size:

## 2018-12-11 ENCOUNTER — HOSPITAL ENCOUNTER (OUTPATIENT)
Dept: HOSPITAL 47 - BARWHC3 | Age: 38
Discharge: HOME | End: 2018-12-11
Attending: SURGERY
Payer: COMMERCIAL

## 2018-12-11 VITALS — DIASTOLIC BLOOD PRESSURE: 73 MMHG | HEART RATE: 68 BPM | SYSTOLIC BLOOD PRESSURE: 114 MMHG | TEMPERATURE: 97.4 F

## 2018-12-11 VITALS — BODY MASS INDEX: 35.5 KG/M2

## 2018-12-11 DIAGNOSIS — E66.01: Primary | ICD-10-CM

## 2018-12-11 PROCEDURE — 97803 MED NUTRITION INDIV SUBSEQ: CPT

## 2018-12-11 PROCEDURE — 99211 OFF/OP EST MAY X REQ PHY/QHP: CPT

## 2018-12-11 NOTE — P.BASOAP
Patient called with concerns after today's office visit. She said about 40 cc's of fluid was drawn from her rt knee (she also recv'd an injection), however, her knee is now swollen even more than before and she's getting concerned since it's only been a couple of hours. The pain is not worse, but it's continuing to swell.   Please contact patient to discuss further as soon as possible at [phone] Subjective


Progress Note Date: 12/11/18


Principal diagnosis: 





Morbid obesity





atient returns for one year visit.  She has lost 3 pounds since her last visit.

  Appetite may be slightly increased although her calories still remain under 

1200 per day.  Lately she has had some pain at the site of her xiphoid which 

was previously injured.  Denies reflux.  No vomiting.  She was having some 

dizzy episodes that resolved after stopping her Dyazide.





Objective





- Vital Signs


Vital signs: 


 Vital Signs











Temp  97.4 F L  12/11/18 14:33


 


Pulse  68   12/11/18 14:33


 


Resp      


 


BP  114/73   12/11/18 14:33


 


Pulse Ox      








 Intake & Output











 12/10/18 12/11/18 12/11/18





 18:59 06:59 18:59


 


Weight   96.162 kg














- Exam





Abdomen: Soft, nontender, nondistended


Xiphoid region with mild tenderness, no evidence of pilonidal cyst





Assessment/Plan


(1) Morbid obesity


Narrative/Plan: 


Patient doing well at this time.  Continue dietary and exercise regimen.  The 

patient will be following a treadmill and will be training for a organized run 

neck some her.  We'll check one year labs at this time.  Patient will follow-up 

with orthopedics if her pain at the sacrum persists.





Plan: 


Date: 12/11/18





Initial Weight: 142.836 kg





Initial BMI: 52.8





Current Weight: 96.162 kg





Current BMI: 35.5





Type of Surgery: 





Total Volume in Band: 





Previous Volume: 





Volume Removed: 





Volume Added: 





Band Size:

## 2018-12-17 ENCOUNTER — HOSPITAL ENCOUNTER (OUTPATIENT)
Dept: HOSPITAL 47 - LABWHC1 | Age: 38
Discharge: HOME | End: 2018-12-17
Attending: SURGERY
Payer: COMMERCIAL

## 2018-12-17 ENCOUNTER — HOSPITAL ENCOUNTER (OUTPATIENT)
Dept: HOSPITAL 47 - RADMAMWWP | Age: 38
Discharge: HOME | End: 2018-12-17
Attending: OBSTETRICS & GYNECOLOGY
Payer: COMMERCIAL

## 2018-12-17 DIAGNOSIS — E66.01: Primary | ICD-10-CM

## 2018-12-17 DIAGNOSIS — E55.9: ICD-10-CM

## 2018-12-17 DIAGNOSIS — K90.89: ICD-10-CM

## 2018-12-17 DIAGNOSIS — Z12.31: Primary | ICD-10-CM

## 2018-12-17 LAB
ALBUMIN SERPL-MCNC: 4.1 G/DL (ref 3.8–4.9)
ALBUMIN/GLOB SERPL: 1.71 G/DL (ref 1.2–2.1)
ALP SERPL-CCNC: 59 U/L (ref 41–126)
ALT SERPL-CCNC: 16 U/L (ref 8–44)
ANION GAP SERPL CALC-SCNC: 6.8 MMOL/L (ref 4–12)
AST SERPL-CCNC: 19 U/L (ref 13–35)
BUN SERPL-SCNC: 16 MG/DL (ref 9–27)
CALCIUM SPEC-MCNC: 9 MG/DL (ref 8.7–10.3)
CHLORIDE SERPL-SCNC: 108 MMOL/L (ref 96–109)
CO2 SERPL-SCNC: 27.2 MMOL/L (ref 21.6–31.8)
ERYTHROCYTE [DISTWIDTH] IN BLOOD BY AUTOMATED COUNT: 4.44 M/UL (ref 3.8–5.4)
ERYTHROCYTE [DISTWIDTH] IN BLOOD: 14.3 % (ref 11.5–15.5)
GLOBULIN SER CALC-MCNC: 2.4 G/DL (ref 2.1–3.7)
GLUCOSE SERPL-MCNC: 84 MG/DL (ref 70–110)
HCT VFR BLD AUTO: 38.6 % (ref 34–46)
HGB BLD-MCNC: 11.7 GM/DL (ref 11.4–16)
MCH RBC QN AUTO: 26.4 PG (ref 25–35)
MCHC RBC AUTO-ENTMCNC: 30.4 G/DL (ref 31–37)
MCV RBC AUTO: 86.9 FL (ref 80–100)
PLATELET # BLD AUTO: 205 K/UL (ref 150–450)
POTASSIUM SERPL-SCNC: 4.8 MMOL/L (ref 3.5–5.5)
PROT SERPL-MCNC: 6.5 G/DL (ref 6.2–8.2)
SODIUM SERPL-SCNC: 142 MMOL/L (ref 135–145)
VIT B12 SERPL-MCNC: 1754 PG/ML (ref 200–944)
WBC # BLD AUTO: 4.6 K/UL (ref 3.8–10.6)

## 2018-12-17 PROCEDURE — 82306 VITAMIN D 25 HYDROXY: CPT

## 2018-12-17 PROCEDURE — 82607 VITAMIN B-12: CPT

## 2018-12-17 PROCEDURE — 84425 ASSAY OF VITAMIN B-1: CPT

## 2018-12-17 PROCEDURE — 80053 COMPREHEN METABOLIC PANEL: CPT

## 2018-12-17 PROCEDURE — 83540 ASSAY OF IRON: CPT

## 2018-12-17 PROCEDURE — 85027 COMPLETE CBC AUTOMATED: CPT

## 2018-12-17 PROCEDURE — 36415 COLL VENOUS BLD VENIPUNCTURE: CPT

## 2018-12-17 PROCEDURE — 77067 SCR MAMMO BI INCL CAD: CPT

## 2018-12-17 PROCEDURE — 82746 ASSAY OF FOLIC ACID SERUM: CPT

## 2018-12-17 NOTE — MM
Reason for exam: screening  (asymptomatic).

Baseline mammogram.



History:

Family history of breast cancer in aunt at age 48.



Physical Findings:

Nurse did not find any significant physical abnormalities on exam.



MG Screening Mammo w CAD

Bilateral CC and MLO view(s) were taken.

There are scattered fibroglandular densities.  There is no discrete abnormality.



These results were verbally communicated with the patient and result sheet given 

to the patient on 12/17/18.





ASSESSMENT: Negative, BI-RAD 1



RECOMMENDATION:

Routine screening mammogram of both breasts at age 41.

## 2020-01-07 ENCOUNTER — HOSPITAL ENCOUNTER (OUTPATIENT)
Dept: HOSPITAL 47 - BARWHC3 | Age: 40
Discharge: HOME | End: 2020-01-07
Attending: SURGERY
Payer: COMMERCIAL

## 2020-01-07 VITALS
TEMPERATURE: 98.3 F | HEART RATE: 73 BPM | SYSTOLIC BLOOD PRESSURE: 114 MMHG | RESPIRATION RATE: 16 BRPM | DIASTOLIC BLOOD PRESSURE: 78 MMHG

## 2020-01-07 VITALS — BODY MASS INDEX: 35.5 KG/M2

## 2020-01-07 DIAGNOSIS — E66.01: Primary | ICD-10-CM

## 2020-01-07 PROCEDURE — 99211 OFF/OP EST MAY X REQ PHY/QHP: CPT

## 2020-01-07 NOTE — P.BASOAP
Subjective


Progress Note Date: 01/07/20


Principal diagnosis: 





Morbid obesity





Patient returns for reevaluation.  Has not been seen in over one year.  Doing 

fairly well.  Maintaining her weight loss.  She has been exercising regularly.  

She was able to run the five-mile swamp foot last year.  Lately has had some 

slight increased heartburn.  She describes a rash beneath the pannus at times.





Objective





- Vital Signs


Vital signs: 


                                   Vital Signs











Temp  98.3 F   01/07/20 14:49


 


Pulse  73   01/07/20 14:49


 


Resp  16   01/07/20 14:49


 


BP  114/78   01/07/20 14:49


 


Pulse Ox      








                                 Intake & Output











 01/06/20 01/07/20 01/07/20





 18:59 06:59 18:59


 


Weight   96.162 kg














- Exam





Abdomen: Soft, nontender, nondistended





Assessment/Plan


(1) Morbid obesity


Narrative/Plan: 


Overall patient doing well.  Maintaining her weight and is more active.  

Continued exercise and dietary regimen.  Check annual labs.  Patient will 

consider plastic surgery evaluation for panniculitis.





Plan: 


Date: 01/07/20





Initial Weight: 142.836 kg





Initial BMI: 52.8





Current Weight: 96.162 kg





Current BMI: 35.5





Type of Surgery: 





Total Volume in Band: 





Previous Volume: 





Volume Removed: 





Volume Added: 





Band Size:

## 2020-01-10 ENCOUNTER — HOSPITAL ENCOUNTER (OUTPATIENT)
Dept: HOSPITAL 47 - LABWHC1 | Age: 40
Discharge: HOME | End: 2020-01-10
Attending: SURGERY
Payer: COMMERCIAL

## 2020-01-10 DIAGNOSIS — E66.01: Primary | ICD-10-CM

## 2020-01-10 DIAGNOSIS — E55.9: ICD-10-CM

## 2020-01-10 DIAGNOSIS — K90.89: ICD-10-CM

## 2020-01-10 LAB
ALBUMIN SERPL-MCNC: 4.3 G/DL (ref 3.8–4.9)
ALBUMIN/GLOB SERPL: 1.79 G/DL (ref 1.6–3.17)
ALP SERPL-CCNC: 58 U/L (ref 41–126)
ALT SERPL-CCNC: 15 U/L (ref 8–44)
ANION GAP SERPL CALC-SCNC: 6.2 MMOL/L (ref 4–12)
AST SERPL-CCNC: 19 U/L (ref 13–35)
BUN SERPL-SCNC: 20 MG/DL (ref 9–27)
BUN/CREAT SERPL: 25 RATIO (ref 12–20)
CALCIUM SPEC-MCNC: 9 MG/DL (ref 8.7–10.3)
CHLORIDE SERPL-SCNC: 108 MMOL/L (ref 96–109)
CO2 SERPL-SCNC: 26.8 MMOL/L (ref 21.6–31.8)
ERYTHROCYTE [DISTWIDTH] IN BLOOD BY AUTOMATED COUNT: 4.47 M/UL (ref 3.8–5.4)
ERYTHROCYTE [DISTWIDTH] IN BLOOD: 15.7 % (ref 11.5–15.5)
GLOBULIN SER CALC-MCNC: 2.4 G/DL (ref 1.6–3.3)
GLUCOSE SERPL-MCNC: 91 MG/DL (ref 70–110)
HCT VFR BLD AUTO: 36.2 % (ref 34–46)
HGB BLD-MCNC: 10.7 GM/DL (ref 11.4–16)
IRON SERPL-MCNC: 20 UG/DL (ref 50–170)
MCH RBC QN AUTO: 24 PG (ref 25–35)
MCHC RBC AUTO-ENTMCNC: 29.7 G/DL (ref 31–37)
MCV RBC AUTO: 80.8 FL (ref 80–100)
PLATELET # BLD AUTO: 217 K/UL (ref 150–450)
POTASSIUM SERPL-SCNC: 4.8 MMOL/L (ref 3.5–5.5)
PROT SERPL-MCNC: 6.7 G/DL (ref 6.2–8.2)
SODIUM SERPL-SCNC: 141 MMOL/L (ref 135–145)
VIT B12 SERPL-MCNC: 1858 PG/ML (ref 200–944)
WBC # BLD AUTO: 5 K/UL (ref 3.8–10.6)

## 2020-01-10 PROCEDURE — 84425 ASSAY OF VITAMIN B-1: CPT

## 2020-01-10 PROCEDURE — 82746 ASSAY OF FOLIC ACID SERUM: CPT

## 2020-01-10 PROCEDURE — 36415 COLL VENOUS BLD VENIPUNCTURE: CPT

## 2020-01-10 PROCEDURE — 82306 VITAMIN D 25 HYDROXY: CPT

## 2020-01-10 PROCEDURE — 83540 ASSAY OF IRON: CPT

## 2020-01-10 PROCEDURE — 82607 VITAMIN B-12: CPT

## 2020-01-10 PROCEDURE — 85027 COMPLETE CBC AUTOMATED: CPT

## 2020-01-10 PROCEDURE — 80053 COMPREHEN METABOLIC PANEL: CPT

## 2020-07-14 ENCOUNTER — HOSPITAL ENCOUNTER (OUTPATIENT)
Dept: HOSPITAL 47 - BARWHC3 | Age: 40
Discharge: HOME | End: 2020-07-14
Attending: SURGERY
Payer: COMMERCIAL

## 2020-07-14 VITALS — BODY MASS INDEX: 37.5 KG/M2

## 2020-07-14 DIAGNOSIS — E66.01: Primary | ICD-10-CM

## 2020-07-14 PROCEDURE — 99211 OFF/OP EST MAY X REQ PHY/QHP: CPT

## 2020-07-14 NOTE — P.BASOAP
Subjective


Progress Note Date: 07/14/20


Principal diagnosis: 





Morbid obesity





Patient returns for reevaluation.  Lately has had increased depression and 

anxiety.  Activity level has decreased.  She did see a plastic surgeon a few 

weeks ago.  He wants her to lose an additional 40 pounds.  Recent labs show a 

low iron and low hemoglobin.  Patient states she is having heavy menses.  Denies

rectal bleeding or melena.  Patient is training for a half marathon currently.


With plastic surgery in November.





Objective





- Exam





Abdomen: Soft, nontender, nondistended





Assessment/Plan


(1) Morbid obesity


Narrative/Plan: 


Patient doing fairly well.  Unfortunately had a slight weight gain recently.  We

will resume her exercise regimen.  Continue monitoring caloric intake.  Follow-

up with plastics in November.  Follow-up with myself in January.  Repeat labs at

that time.  Patient discussing her heavy menses with her gynecologist.





Plan: 


Date: 





Initial Weight: 142.836 kg





Initial BMI: 





Current Weight: 





Current BMI: 





Type of Surgery: 





Total Volume in Band: 





Previous Volume: 





Volume Removed: 





Volume Added: 





Band Size:

## 2020-07-15 VITALS — TEMPERATURE: 98.2 F | SYSTOLIC BLOOD PRESSURE: 117 MMHG | DIASTOLIC BLOOD PRESSURE: 69 MMHG | HEART RATE: 62 BPM

## 2021-01-26 ENCOUNTER — HOSPITAL ENCOUNTER (OUTPATIENT)
Dept: HOSPITAL 47 - BARWHC3 | Age: 41
Discharge: HOME | End: 2021-01-26
Attending: SURGERY
Payer: COMMERCIAL

## 2021-01-26 VITALS
TEMPERATURE: 98.1 F | SYSTOLIC BLOOD PRESSURE: 137 MMHG | DIASTOLIC BLOOD PRESSURE: 70 MMHG | RESPIRATION RATE: 16 BRPM | HEART RATE: 76 BPM

## 2021-01-26 VITALS — BODY MASS INDEX: 40.7 KG/M2

## 2021-01-26 DIAGNOSIS — E66.01: ICD-10-CM

## 2021-01-26 DIAGNOSIS — Z46.51: Primary | ICD-10-CM

## 2021-01-26 DIAGNOSIS — Z98.84: ICD-10-CM

## 2021-01-26 PROCEDURE — 99211 OFF/OP EST MAY X REQ PHY/QHP: CPT

## 2021-01-26 NOTE — P.BASOAP
Subjective


Progress Note Date: 01/26/21


Principal diagnosis: 





Morbid obesity





Patient returns for recheck.  She has gained some weight since last visit.  More

stress eating lately.  Still feels good restriction.  Started taking an 

antidepressant as well.  Patient still exercising a fair amount.  Mild 

heartburn.  Patient stopped her antiacid a few years ago.  No dysphagia.  No 

vomiting.





Objective





- Vital Signs


Vital signs: 


                                   Vital Signs











Temp  98.1 F   01/26/21 16:05


 


Pulse  76   01/26/21 16:05


 


Resp  16   01/26/21 16:05


 


BP  137/70   01/26/21 16:05


 


Pulse Ox      








                                 Intake & Output











 01/25/21 01/26/21 01/26/21





 18:59 06:59 18:59


 


Weight   110.223 kg














- Exam





Abdomen: Soft, nontender, nondistended





Assessment/Plan


(1) Morbid obesity


Narrative/Plan: 


Patient is overall doing fairly well.  She has gained some weight but remains 

with these restriction and good exercise level.  Will check annual labs.  We'll 

give him refill of Prilosec prescription.  Follow up 3 months.





Plan: 


Date: 01/26/21





Initial Weight: 142.836 kg





Initial BMI: 52.8





Current Weight: 110.223 kg





Current BMI: 40.7





Type of Surgery: 





Total Volume in Band: 





Previous Volume: 





Volume Removed: 





Volume Added: 





Band Size:

## 2021-03-16 ENCOUNTER — HOSPITAL ENCOUNTER (OUTPATIENT)
Dept: HOSPITAL 47 - LABWHC1 | Age: 41
Discharge: HOME | End: 2021-03-16
Attending: SURGERY
Payer: COMMERCIAL

## 2021-03-16 DIAGNOSIS — K90.89: ICD-10-CM

## 2021-03-16 DIAGNOSIS — I26.99: ICD-10-CM

## 2021-03-16 DIAGNOSIS — E55.9: Primary | ICD-10-CM

## 2021-03-16 DIAGNOSIS — E66.01: ICD-10-CM

## 2021-03-16 LAB
ALBUMIN SERPL-MCNC: 4.4 G/DL (ref 3.8–4.9)
ALBUMIN/GLOB SERPL: 1.76 G/DL (ref 1.6–3.17)
ALP SERPL-CCNC: 62 U/L (ref 41–126)
ALT SERPL-CCNC: 19 U/L (ref 8–44)
ANION GAP SERPL CALC-SCNC: 6.9 MMOL/L (ref 4–12)
AST SERPL-CCNC: 21 U/L (ref 13–35)
BUN SERPL-SCNC: 14 MG/DL (ref 9–27)
BUN/CREAT SERPL: 17.5 RATIO (ref 12–20)
CALCIUM SPEC-MCNC: 9.1 MG/DL (ref 8.7–10.3)
CHLORIDE SERPL-SCNC: 107 MMOL/L (ref 96–109)
CHOLEST SERPL-MCNC: 203 MG/DL (ref 0–200)
CO2 SERPL-SCNC: 27.1 MMOL/L (ref 21.6–31.8)
ERYTHROCYTE [DISTWIDTH] IN BLOOD BY AUTOMATED COUNT: 4.51 X 10*6/UL (ref 4.1–5.2)
ERYTHROCYTE [DISTWIDTH] IN BLOOD: 15 % (ref 11.5–14.5)
GLOBULIN SER CALC-MCNC: 2.5 G/DL (ref 1.6–3.3)
GLUCOSE SERPL-MCNC: 82 MG/DL (ref 70–110)
HCT VFR BLD AUTO: 39.3 % (ref 37.2–46.3)
HDLC SERPL-MCNC: 42 MG/DL (ref 40–60)
HGB BLD-MCNC: 11.7 G/DL (ref 12–15)
IRON SERPL-MCNC: 25 UG/DL (ref 50–170)
LDLC SERPL CALC-MCNC: 126.6 MG/DL (ref 0–131)
MAGNESIUM SPEC-SCNC: 2 MG/DL (ref 1.5–2.4)
MCH RBC QN AUTO: 25.9 PG (ref 27–32)
MCHC RBC AUTO-ENTMCNC: 29.8 G/DL (ref 32–37)
MCV RBC AUTO: 87.1 FL (ref 80–97)
PLATELET # BLD AUTO: 214 X 10*3/UL (ref 140–440)
POTASSIUM SERPL-SCNC: 4.3 MMOL/L (ref 3.5–5.5)
PROT SERPL-MCNC: 6.9 G/DL (ref 6.2–8.2)
SODIUM SERPL-SCNC: 141 MMOL/L (ref 135–145)
T4 FREE SERPL-MCNC: 1.1 NG/DL (ref 0.8–1.8)
TRIGL SERPL-MCNC: 172 MG/DL (ref 0–149)
VIT B12 SERPL-MCNC: 341 PG/ML (ref 200–944)
VLDLC SERPL CALC-MCNC: 34.4 MG/DL (ref 5–40)
WBC # BLD AUTO: 5.09 X 10*3/UL (ref 4.5–10)

## 2021-03-16 PROCEDURE — 82306 VITAMIN D 25 HYDROXY: CPT

## 2021-03-16 PROCEDURE — 85027 COMPLETE CBC AUTOMATED: CPT

## 2021-03-16 PROCEDURE — 84425 ASSAY OF VITAMIN B-1: CPT

## 2021-03-16 PROCEDURE — 80053 COMPREHEN METABOLIC PANEL: CPT

## 2021-03-16 PROCEDURE — 84439 ASSAY OF FREE THYROXINE: CPT

## 2021-03-16 PROCEDURE — 83540 ASSAY OF IRON: CPT

## 2021-03-16 PROCEDURE — 82607 VITAMIN B-12: CPT

## 2021-03-16 PROCEDURE — 82746 ASSAY OF FOLIC ACID SERUM: CPT

## 2021-03-16 PROCEDURE — 84443 ASSAY THYROID STIM HORMONE: CPT

## 2021-03-16 PROCEDURE — 80061 LIPID PANEL: CPT

## 2021-03-16 PROCEDURE — 83735 ASSAY OF MAGNESIUM: CPT

## 2021-03-16 PROCEDURE — 36415 COLL VENOUS BLD VENIPUNCTURE: CPT

## 2021-05-18 ENCOUNTER — HOSPITAL ENCOUNTER (OUTPATIENT)
Dept: HOSPITAL 47 - BARWHC3 | Age: 41
End: 2021-05-18
Attending: SURGERY
Payer: COMMERCIAL

## 2021-05-18 VITALS — SYSTOLIC BLOOD PRESSURE: 120 MMHG | HEART RATE: 76 BPM | DIASTOLIC BLOOD PRESSURE: 86 MMHG | TEMPERATURE: 97.9 F

## 2021-05-18 VITALS — BODY MASS INDEX: 41.4 KG/M2

## 2021-05-18 DIAGNOSIS — Z91.018: ICD-10-CM

## 2021-05-18 DIAGNOSIS — E66.01: Primary | ICD-10-CM

## 2021-05-18 DIAGNOSIS — Z91.011: ICD-10-CM

## 2021-05-18 DIAGNOSIS — Z98.84: ICD-10-CM

## 2021-05-18 DIAGNOSIS — Z91.012: ICD-10-CM

## 2021-05-18 DIAGNOSIS — Z91.040: ICD-10-CM

## 2021-05-18 PROCEDURE — 99211 OFF/OP EST MAY X REQ PHY/QHP: CPT

## 2021-05-18 NOTE — P.BASOAP
Subjective


Progress Note Date: 05/18/21


Principal diagnosis: 





Morbid obesity





Patient returns for follow-up.  Overall doing fairly well.  She has gained 4 

pounds.  So she is not drinking enough water and lately started drinking 1 

gallon of water daily.  She was taking her antiacids intermittently for mild 

reflux.  She has been avoiding eating until noon every day.  She was depressed 

although this is improving.  Patient remains fairly active and has a new 

exercise .  She signed up for [employer] runs in the near future.





Objective





- Vital Signs


Vital signs: 


                                   Vital Signs











Temp  97.9 F   05/18/21 13:13


 


Pulse  76   05/18/21 13:13


 


Resp      


 


BP  120/86   05/18/21 13:13


 


Pulse Ox      








                                 Intake & Output











 05/17/21 05/18/21 05/18/21





 18:59 06:59 18:59


 


Weight   112.037 kg














- Exam





Abdomen: Soft, nontender, nondistended





Assessment/Plan


(1) Morbid obesity


Narrative/Plan: 


Overall patient doing fairly well.  Unfortunately she did gain back 4 pounds 

since last visit.  She is remaining active with her .  Continue 

post bariatric surgery diet.  Plan follow-up in November.  We'll check annual 

labs.





Plan: 


Date: 05/18/21





Initial Weight: 142.836 kg





Initial BMI: 52.8





Current Weight: 112.037 kg





Current BMI: 41.4





Type of Surgery: 





Total Volume in Band: 





Previous Volume: 





Volume Removed: 





Volume Added: 





Band Size:

## 2023-05-26 ENCOUNTER — HOSPITAL ENCOUNTER (OUTPATIENT)
Dept: HOSPITAL 47 - LABWHC1 | Age: 43
Discharge: HOME | End: 2023-05-26
Attending: FAMILY MEDICINE
Payer: COMMERCIAL

## 2023-05-26 DIAGNOSIS — E78.5: Primary | ICD-10-CM

## 2023-05-26 DIAGNOSIS — K21.9: ICD-10-CM

## 2023-05-26 DIAGNOSIS — R10.84: ICD-10-CM

## 2023-05-26 DIAGNOSIS — R53.83: ICD-10-CM

## 2023-05-26 PROCEDURE — 86003 ALLG SPEC IGE CRUDE XTRC EA: CPT

## 2023-05-26 PROCEDURE — 84481 FREE ASSAY (FT-3): CPT

## 2023-05-26 PROCEDURE — 86376 MICROSOMAL ANTIBODY EACH: CPT

## 2023-05-26 PROCEDURE — 36415 COLL VENOUS BLD VENIPUNCTURE: CPT

## 2023-05-26 PROCEDURE — 83695 ASSAY OF LIPOPROTEIN(A): CPT

## 2023-08-18 ENCOUNTER — HOSPITAL ENCOUNTER (OUTPATIENT)
Dept: HOSPITAL 47 - RADMAMWWP | Age: 43
Discharge: HOME | End: 2023-08-18
Attending: FAMILY MEDICINE
Payer: COMMERCIAL

## 2023-08-18 DIAGNOSIS — Z12.31: Primary | ICD-10-CM

## 2023-08-18 DIAGNOSIS — Z80.3: ICD-10-CM

## 2023-08-18 PROCEDURE — 77063 BREAST TOMOSYNTHESIS BI: CPT

## 2023-08-18 PROCEDURE — 77067 SCR MAMMO BI INCL CAD: CPT

## 2023-08-21 NOTE — MM
Reason for Exam: Screening  (asymptomatic). 

Last mammogram was performed 1 year(s) and 4 month(s) ago. 





Patient History: 

Menarche at age 14. First Full-Term Pregnancy at age 31. Late child-bearing (after 31).

Premenopausal. Patient has history of breast feeding.

Maternal aunt had breast cancer, age 48.

Last menstrual period: 07/24/2023





Risk Values: 

[place] 5 year model risk: 0.9%.

NCI Lifetime model risk: 13.4%.





Prior Study Comparison: 

12/17/2018 Bilateral Screening Mammogram, [facility]. 4/29/2022 Bilateral Screening Mammogram, [facility]. 





Tissue Density: 

There are scattered fibroglandular densities.





Findings: 

Analyzed By CAD. 

Pattern appears symmetrical and stable. No significant interval change is evident.

No suspicious groups of microcalcifications, spiculated or lobular masses, architectural distortion

or other secondary signs of malignancy are mammographically apparent. 





Overall Assessment: Benign, BI-RAD 2





Management: 

Screening Mammogram of both breasts in 1 year.

A negative mammogram report should not preclude additional follow up of suspicious palpable

abnormalities.

Patient should continue monthly self breast exam.

A clinical breast exam by your physician is recommended on an annual basis and results should be

correlated with mammographic findings.



Electronically signed and approved by: Aldo Caro D.O. Radiologis

## 2025-04-01 ENCOUNTER — HOSPITAL ENCOUNTER (OUTPATIENT)
Dept: HOSPITAL 47 - RADMAMWWP | Age: 45
Discharge: HOME | End: 2025-04-01
Attending: OBSTETRICS & GYNECOLOGY
Payer: COMMERCIAL

## 2025-04-01 DIAGNOSIS — R92.323: ICD-10-CM

## 2025-04-01 DIAGNOSIS — Z92.0: ICD-10-CM

## 2025-04-01 DIAGNOSIS — Z12.31: Primary | ICD-10-CM

## 2025-04-01 DIAGNOSIS — Z80.3: ICD-10-CM

## 2025-04-01 PROCEDURE — 77067 SCR MAMMO BI INCL CAD: CPT

## 2025-04-01 PROCEDURE — 77063 BREAST TOMOSYNTHESIS BI: CPT

## 2025-04-07 ENCOUNTER — HOSPITAL ENCOUNTER (OUTPATIENT)
Dept: HOSPITAL 47 - RADCTMAIN | Age: 45
Discharge: HOME | End: 2025-04-07
Attending: FAMILY MEDICINE
Payer: COMMERCIAL

## 2025-04-07 DIAGNOSIS — I25.10: ICD-10-CM

## 2025-04-07 DIAGNOSIS — Z13.6: Primary | ICD-10-CM

## 2025-04-07 PROCEDURE — 75571 CT HRT W/O DYE W/CA TEST: CPT

## 2025-07-11 ENCOUNTER — HOSPITAL ENCOUNTER (OUTPATIENT)
Dept: HOSPITAL 47 - OR | Age: 45
End: 2025-07-11
Attending: SURGERY
Payer: COMMERCIAL

## 2025-07-11 VITALS — TEMPERATURE: 97.6 F

## 2025-07-11 VITALS — SYSTOLIC BLOOD PRESSURE: 134 MMHG | DIASTOLIC BLOOD PRESSURE: 71 MMHG | HEART RATE: 82 BPM | RESPIRATION RATE: 18 BRPM

## 2025-07-11 VITALS — BODY MASS INDEX: 29 KG/M2

## 2025-07-11 DIAGNOSIS — F32.A: ICD-10-CM

## 2025-07-11 DIAGNOSIS — Z91.012: ICD-10-CM

## 2025-07-11 DIAGNOSIS — E03.9: ICD-10-CM

## 2025-07-11 DIAGNOSIS — Z79.899: ICD-10-CM

## 2025-07-11 DIAGNOSIS — Z79.890: ICD-10-CM

## 2025-07-11 DIAGNOSIS — I25.10: ICD-10-CM

## 2025-07-11 DIAGNOSIS — G43.909: ICD-10-CM

## 2025-07-11 DIAGNOSIS — K80.11: Primary | ICD-10-CM

## 2025-07-11 DIAGNOSIS — Z98.84: ICD-10-CM

## 2025-07-11 DIAGNOSIS — Z91.040: ICD-10-CM

## 2025-07-11 DIAGNOSIS — F41.9: ICD-10-CM

## 2025-07-11 DIAGNOSIS — K21.9: ICD-10-CM

## 2025-07-11 DIAGNOSIS — Z91.018: ICD-10-CM

## 2025-07-11 DIAGNOSIS — Z86.39: ICD-10-CM

## 2025-07-11 PROCEDURE — 88304 TISSUE EXAM BY PATHOLOGIST: CPT

## 2025-07-11 PROCEDURE — 81025 URINE PREGNANCY TEST: CPT

## 2025-07-11 PROCEDURE — 47562 LAPAROSCOPIC CHOLECYSTECTOMY: CPT

## 2025-07-11 RX ADMIN — TRAMADOL HYDROCHLORIDE STA MG: 50 TABLET ORAL at 16:42

## 2025-07-11 RX ADMIN — ACETAMINOPHEN PRN MG: 500 TABLET ORAL at 12:48

## 2025-07-11 RX ADMIN — POTASSIUM CHLORIDE SCH MLS/HR: 14.9 INJECTION, SOLUTION INTRAVENOUS at 12:43

## 2025-07-11 RX ADMIN — HYDROMORPHONE HYDROCHLORIDE PRN MG: 1 INJECTION, SOLUTION INTRAMUSCULAR; INTRAVENOUS; SUBCUTANEOUS at 14:50

## 2025-07-11 RX ADMIN — BUPIVACAINE HYDROCHLORIDE ONE ML: 2.5 INJECTION, SOLUTION EPIDURAL; INFILTRATION; INTRACAUDAL; PERINEURAL at 13:21

## 2025-07-11 RX ADMIN — SCOPALAMINE STA PATCH: 1 PATCH, EXTENDED RELEASE TRANSDERMAL at 12:49

## 2025-07-11 RX ADMIN — HEPARIN SODIUM PRN UNIT: 5000 INJECTION, SOLUTION INTRAVENOUS; SUBCUTANEOUS at 12:49

## 2025-07-11 RX ADMIN — POTASSIUM CHLORIDE ONE MLS: 14.9 INJECTION, SOLUTION INTRAVENOUS at 14:01

## 2025-07-11 RX ADMIN — ONDANSETRON STA MG: 2 INJECTION INTRAMUSCULAR; INTRAVENOUS at 12:50

## 2025-07-11 RX ADMIN — POTASSIUM CHLORIDE ONE MLS: 14.9 INJECTION, SOLUTION INTRAVENOUS at 12:42

## 2025-07-11 RX ADMIN — DEXAMETHASONE SODIUM PHOSPHATE STA MG: 4 INJECTION, SOLUTION INTRAMUSCULAR; INTRAVENOUS at 12:51

## 2025-07-11 RX ADMIN — CEFAZOLIN SODIUM PRN MLS: 10 INJECTION, POWDER, FOR SOLUTION INTRAVENOUS at 13:06

## 2025-07-11 RX ADMIN — BUPIVACAINE HYDROCHLORIDE ONE ML: 2.5 INJECTION, SOLUTION EPIDURAL; INFILTRATION; INTRACAUDAL; PERINEURAL at 13:29
